# Patient Record
Sex: FEMALE | Race: OTHER | NOT HISPANIC OR LATINO | URBAN - METROPOLITAN AREA
[De-identification: names, ages, dates, MRNs, and addresses within clinical notes are randomized per-mention and may not be internally consistent; named-entity substitution may affect disease eponyms.]

---

## 2017-07-02 ENCOUNTER — EMERGENCY (EMERGENCY)
Facility: HOSPITAL | Age: 27
LOS: 1 days | Discharge: HOME | End: 2017-07-02
Attending: EMERGENCY MEDICINE | Admitting: EMERGENCY MEDICINE
Payer: COMMERCIAL

## 2017-07-02 VITALS
HEIGHT: 67 IN | OXYGEN SATURATION: 99 % | TEMPERATURE: 98 F | SYSTOLIC BLOOD PRESSURE: 132 MMHG | HEART RATE: 85 BPM | WEIGHT: 119.93 LBS | DIASTOLIC BLOOD PRESSURE: 93 MMHG | RESPIRATION RATE: 16 BRPM

## 2017-07-02 DIAGNOSIS — R51 HEADACHE: ICD-10-CM

## 2017-07-02 DIAGNOSIS — R55 SYNCOPE AND COLLAPSE: ICD-10-CM

## 2017-07-02 PROCEDURE — 70450 CT HEAD/BRAIN W/O DYE: CPT

## 2017-07-02 PROCEDURE — 70450 CT HEAD/BRAIN W/O DYE: CPT | Mod: 26

## 2017-07-02 PROCEDURE — 96374 THER/PROPH/DIAG INJ IV PUSH: CPT

## 2017-07-02 PROCEDURE — 99284 EMERGENCY DEPT VISIT MOD MDM: CPT

## 2017-07-02 PROCEDURE — 99284 EMERGENCY DEPT VISIT MOD MDM: CPT | Mod: 25

## 2017-07-02 RX ORDER — SODIUM CHLORIDE 9 MG/ML
1000 INJECTION INTRAMUSCULAR; INTRAVENOUS; SUBCUTANEOUS ONCE
Qty: 0 | Refills: 0 | Status: COMPLETED | OUTPATIENT
Start: 2017-07-02 | End: 2017-07-02

## 2017-07-02 RX ORDER — METOCLOPRAMIDE HCL 10 MG
10 TABLET ORAL ONCE
Qty: 0 | Refills: 0 | Status: COMPLETED | OUTPATIENT
Start: 2017-07-02 | End: 2017-07-02

## 2017-07-02 RX ADMIN — SODIUM CHLORIDE 2000 MILLILITER(S): 9 INJECTION INTRAMUSCULAR; INTRAVENOUS; SUBCUTANEOUS at 14:52

## 2017-07-02 RX ADMIN — Medication 10 MILLIGRAM(S): at 14:52

## 2017-07-02 NOTE — ED PROVIDER NOTE - ATTENDING CONTRIBUTION TO CARE
27yo female with altitude exposure one week prior with syncopal episode and mild head injury presenting with HA that began a few days ago. CT scan negative. pt well appearing. reassured. Possibly secondary to altitude exposure vs concussion. No cerebral edema noted on CT. Will follow up with neurology as outpatient.

## 2017-07-02 NOTE — ED ADULT NURSE NOTE - OBJECTIVE STATEMENT
Patient to ED s/p concussion last week after sustaining head trauma. Patient states "I started having symptoms 3 days later, first with tingling of the head, back pain and headache pain." Headache pain confined to parietal lobe, patient also reports intermittent blurred vision. PERRLA, good balance and gait noted, no ataxia.

## 2017-07-02 NOTE — ED PROVIDER NOTE - OBJECTIVE STATEMENT
25 y/o female c/o ha. pt states had syncopal episode due to altitude 1 wk ago while at wedding outside of [place]. pt seen at ED at that time and negative labs and ct scan. pt reports 3 days later developed difficulty finding words and was moving slow. [facility] and dx with post concussion syndrome. Pt notes diffuse ha past 24 hrs. + photophobia and nausea. no cp, sob, abd pain, v/d. no neck or back pain. pt reports achy all over. no numbness, tingling or weakness. no further complaints.

## 2017-07-02 NOTE — ED ADULT TRIAGE NOTE - CHIEF COMPLAINT QUOTE
" I had a fall hit my head after I fainted due high altitude sickness in [place]. I started to have difficulties to go on with my every day life since Tuesday and I went to a Neurologist that diagnosed me with post concussion syndrome. I have severe 7/10 headache difficulty concentrating " No medication taken

## 2017-07-02 NOTE — ED PROVIDER NOTE - MEDICAL DECISION MAKING DETAILS
headache s/p traveling to high altitude and s/p head injury. neuro exam intact. orr relieved with reglan and fluid. VSS. ct scan no acute pathology. tylenol prn and f/u with neurology

## 2018-03-15 NOTE — ED ADULT NURSE NOTE - CAS TRG GENERAL NORM CIRC DET
Subjective:       Patient ID: Eliu Hunter is a 17 y.o. female.    Chief Complaint:  Postpartum Care and Contraception      History of Present Illness  HPI  Pt here today for follow up. She had 1LTCS for NRFHT. She has been breast feeding. She is doing 2.5oz every 2 hours. She reports having nausea and vomiting. She has no appetite. She is not having any abdominal pains. She is drinking gatorade, water, and ensure. She reports not eating anything, occasional crackers.     GYN & OB History  Patient's last menstrual period was 2017.   Date of Last Pap: No result found    OB History    Para Term  AB Living   1 1 1   0 1   SAB TAB Ectopic Multiple Live Births   0     0 1      # Outcome Date GA Lbr Alexander/2nd Weight Sex Delivery Anes PTL Lv   1 Term 18 38w2d  2.835 kg (6 lb 4 oz) F CS-LTranv Spinal N KAVYA      Complications: Fetal Intolerance          Review of Systems  Review of Systems   Constitutional: Negative for chills and fever.   Respiratory: Negative for shortness of breath.    Cardiovascular: Negative for chest pain.   Gastrointestinal: Positive for nausea and vomiting. Negative for abdominal pain.   Genitourinary: Negative for vaginal discharge and vaginal odor.   Neurological: Negative for headaches.           Objective:    Physical Exam:   Constitutional: She is oriented to person, place, and time. She appears well-developed and well-nourished. No distress.    HENT:   Head: Normocephalic and atraumatic.    Eyes: EOM are normal.      Pulmonary/Chest: No respiratory distress.        Abdominal: Soft. She exhibits no distension. Abdominal incision: c/d/i. no signs of infection.                 Neurological: She is alert and oriented to person, place, and time.     Psychiatric: She has a normal mood and affect. Her behavior is normal.          Assessment:        1. Encounter for initial prescription of contraceptive pills              Plan:      1. Encounter for initial  prescription of contraceptive pills  - Patient was counseled today on contraceptive options--Pills, Depo-Provera, IUD, Nexplanon, & Nuva Ring. We discussed the advantages and disadvantages of each. We discussed the continued use of condoms to prevent STDs. The patient elected to use mini-pill. Discussed if use OCPs can decrease milk production.  The session lasted approximately 25 minutes, greater than 50% was counseling. All her questions were answered.   - norethindrone (ORTHO MICRONOR) 0.35 mg tablet; Take 1 tablet (0.35 mg total) by mouth once daily.  Dispense: 30 tablet; Refill: 11       Follow-up in about 4 weeks (around 4/12/2018) for PPV w/Dr. Garcia.        Strong peripheral pulses